# Patient Record
Sex: MALE | Employment: FULL TIME | URBAN - NONMETROPOLITAN AREA
[De-identification: names, ages, dates, MRNs, and addresses within clinical notes are randomized per-mention and may not be internally consistent; named-entity substitution may affect disease eponyms.]

---

## 2024-06-10 ENCOUNTER — NURSE ONLY (OUTPATIENT)
Dept: LAB | Age: 54
End: 2024-06-10

## 2024-06-10 ENCOUNTER — OFFICE VISIT (OUTPATIENT)
Dept: RHEUMATOLOGY | Age: 54
End: 2024-06-10
Payer: COMMERCIAL

## 2024-06-10 VITALS
SYSTOLIC BLOOD PRESSURE: 154 MMHG | DIASTOLIC BLOOD PRESSURE: 90 MMHG | HEART RATE: 89 BPM | OXYGEN SATURATION: 98 % | HEIGHT: 74 IN | WEIGHT: 315 LBS | BODY MASS INDEX: 40.43 KG/M2

## 2024-06-10 DIAGNOSIS — Z79.899 ENCOUNTER FOR LONG-TERM (CURRENT) USE OF HIGH-RISK MEDICATION: ICD-10-CM

## 2024-06-10 DIAGNOSIS — L40.50 PSORIATIC ARTHRITIS (HCC): ICD-10-CM

## 2024-06-10 DIAGNOSIS — L40.50 PSORIATIC ARTHRITIS (HCC): Primary | ICD-10-CM

## 2024-06-10 LAB
ALBUMIN SERPL BCG-MCNC: 4.1 G/DL (ref 3.5–5.1)
ALP SERPL-CCNC: 120 U/L (ref 38–126)
ALT SERPL W/O P-5'-P-CCNC: 19 U/L (ref 11–66)
ANION GAP SERPL CALC-SCNC: 11 MEQ/L (ref 8–16)
AST SERPL-CCNC: 19 U/L (ref 5–40)
BILIRUB SERPL-MCNC: 0.5 MG/DL (ref 0.3–1.2)
BUN SERPL-MCNC: 17 MG/DL (ref 7–22)
CALCIUM SERPL-MCNC: 9.5 MG/DL (ref 8.5–10.5)
CHLORIDE SERPL-SCNC: 103 MEQ/L (ref 98–111)
CO2 SERPL-SCNC: 26 MEQ/L (ref 23–33)
CREAT SERPL-MCNC: 1.1 MG/DL (ref 0.4–1.2)
GFR SERPL CREATININE-BSD FRML MDRD: 80 ML/MIN/1.73M2
GLUCOSE SERPL-MCNC: 109 MG/DL (ref 70–108)
POTASSIUM SERPL-SCNC: 3.6 MEQ/L (ref 3.5–5.2)
PROT SERPL-MCNC: 7.8 G/DL (ref 6.1–8)
SODIUM SERPL-SCNC: 140 MEQ/L (ref 135–145)

## 2024-06-10 PROCEDURE — 99204 OFFICE O/P NEW MOD 45 MIN: CPT | Performed by: INTERNAL MEDICINE

## 2024-06-10 RX ORDER — PREDNISONE 10 MG/1
TABLET ORAL
Qty: 30 TABLET | Refills: 0 | Status: SHIPPED | OUTPATIENT
Start: 2024-06-10 | End: 2024-06-25

## 2024-06-10 RX ORDER — LOSARTAN POTASSIUM AND HYDROCHLOROTHIAZIDE 12.5; 5 MG/1; MG/1
TABLET ORAL
COMMUNITY
Start: 2024-04-30

## 2024-06-10 ASSESSMENT — ENCOUNTER SYMPTOMS
SHORTNESS OF BREATH: 0
VOMITING: 0
NAUSEA: 0
ABDOMINAL PAIN: 0
BLOOD IN STOOL: 0
COUGH: 0

## 2024-06-10 ASSESSMENT — JOINT PAIN
TOTAL NUMBER OF TENDER JOINTS: 5
TOTAL NUMBER OF SWOLLEN JOINTS: 5

## 2024-06-10 NOTE — PROGRESS NOTES
[facility]   Rheumatology Clinic Note      6/10/2024       Reason for Consult:  hand deformities  Requesting Physician:  Yuri Sepulveda MD     CHIEF COMPLAINT:    Chief Complaint   Patient presents with    New Patient     Boutonniere deformity of Rt finger(s)    Other     Deformity is worsened in Lt fingers  Pain in bilateral knees, wrist, Rt middle finger and Lt shoulder.  Symptoms present end of last year  Achy in the mornings  Pain level 5 worsened with applied pressure           HISTORY OF PRESENT ILLNESS:    53 y.o. male presents today for evaluation of hand deformities.    Pain in hands, left wrist started end of last year.  Bilateral knee pain and shoulder started this year.   Stiffness in hands all day.  Left 4th digit deformity.  Ibuprofen provides partial relief.     Has plaque psoriasis in unbilical region and ears.  No IBD-like symptoms.  No h/o uveitis.      Past Medical History:     has a past medical history of Gout and Hypertension.    Past Surgical History:     has no past surgical history on file.    Current Medications:      Current Outpatient Medications:     losartan-hydroCHLOROthiazide (HYZAAR) 50-12.5 MG per tablet, , Disp: , Rfl:     predniSONE (DELTASONE) 10 MG tablet, Take 3 tablets by mouth daily for 5 days, THEN 2 tablets daily for 5 days, THEN 1 tablet daily for 5 days., Disp: 30 tablet, Rfl: 0    Allergies:    Patient has no known allergies.    Social History:     reports that he has never smoked. He has never used smokeless tobacco. He reports current alcohol use. He reports that he does not use drugs.    Family History:   family history includes Gout in his father; Heart Disease in his father; No Known Problems in his mother.      REVIEW OF SYSTEMS:    Review of Systems   Constitutional:  Positive for fatigue. Negative for chills and fever.   Respiratory:  Negative for cough and shortness of breath.    Cardiovascular:  Negative for chest pain and leg swelling.

## 2024-06-12 ENCOUNTER — TELEPHONE (OUTPATIENT)
Dept: RHEUMATOLOGY | Age: 54
End: 2024-06-12

## 2024-06-12 RX ORDER — FOLIC ACID 1 MG/1
1 TABLET ORAL DAILY
Qty: 30 TABLET | Refills: 1 | Status: SHIPPED | OUTPATIENT
Start: 2024-06-12 | End: 2024-08-11

## 2024-06-12 RX ORDER — METHOTREXATE 2.5 MG/1
15 TABLET ORAL WEEKLY
Qty: 24 TABLET | Refills: 1 | Status: SHIPPED | OUTPATIENT
Start: 2024-06-12 | End: 2024-08-11

## 2024-06-12 NOTE — TELEPHONE ENCOUNTER
Please inform pt that his blood work looks good.  I've sent MTX and folic acid to his pharmacy as discussed.

## 2024-06-13 LAB — CYCLIC CITRULLINATED PEPTIDE ANTIBODY IGG: 1.2 U/ML (ref 0–7)

## 2024-08-13 LAB
ALBUMIN: 4.1 G/DL
ALP BLD-CCNC: 104 U/L
ALT SERPL-CCNC: 34 U/L
ANION GAP SERPL CALCULATED.3IONS-SCNC: 11 MMOL/L
AST SERPL-CCNC: 27 U/L
BASOPHILS ABSOLUTE: 0 /ΜL
BASOPHILS RELATIVE PERCENT: 0.4 %
BILIRUB SERPL-MCNC: 0.6 MG/DL (ref 0.1–1.4)
BUN BLDV-MCNC: 16 MG/DL
C-REACTIVE PROTEIN: 4.1
CALCIUM SERPL-MCNC: 8.9 MG/DL
CHLORIDE BLD-SCNC: 103 MMOL/L
CO2: 26 MMOL/L
CREAT SERPL-MCNC: 1.3 MG/DL
EOSINOPHILS ABSOLUTE: 0.2 /ΜL
EOSINOPHILS RELATIVE PERCENT: 3.5 %
GFR, ESTIMATED: 62
GLUCOSE BLD-MCNC: 124 MG/DL
HCT VFR BLD CALC: 43.5 % (ref 41–53)
HEMOGLOBIN: 14.5 G/DL (ref 13.5–17.5)
LYMPHOCYTES ABSOLUTE: 1.6 /ΜL
LYMPHOCYTES RELATIVE PERCENT: 31.7 %
MCH RBC QN AUTO: 31.7 PG
MCHC RBC AUTO-ENTMCNC: 33.3 G/DL
MCV RBC AUTO: 95 FL
MONOCYTES ABSOLUTE: 0.4 /ΜL
MONOCYTES RELATIVE PERCENT: 7.2 %
NEUTROPHILS ABSOLUTE: 3 /ΜL
NEUTROPHILS RELATIVE PERCENT: 56.8 %
PDW BLD-RTO: 47.5 %
PLATELET # BLD: 196 K/ΜL
PMV BLD AUTO: 11 FL
POTASSIUM SERPL-SCNC: 4.1 MMOL/L
RBC # BLD: 4.58 10^6/ΜL
SED RATE, AUTOMATED: 14
SODIUM BLD-SCNC: 140 MMOL/L
TOTAL PROTEIN: 6.3 G/DL (ref 6.4–8.2)
WBC # BLD: 5.2 10^3/ML

## 2024-08-14 ENCOUNTER — TELEPHONE (OUTPATIENT)
Dept: RHEUMATOLOGY | Age: 54
End: 2024-08-14

## 2024-08-14 NOTE — TELEPHONE ENCOUNTER
Please inform patient we received his lab results. His blood counts, liver function, kidney function, and inflammatory markers (CRP, sed rate) were all unremarkable.

## 2024-08-16 ENCOUNTER — OFFICE VISIT (OUTPATIENT)
Dept: RHEUMATOLOGY | Age: 54
End: 2024-08-16
Payer: COMMERCIAL

## 2024-08-16 VITALS
WEIGHT: 315 LBS | SYSTOLIC BLOOD PRESSURE: 142 MMHG | DIASTOLIC BLOOD PRESSURE: 76 MMHG | HEART RATE: 86 BPM | HEIGHT: 74 IN | BODY MASS INDEX: 40.43 KG/M2 | OXYGEN SATURATION: 97 %

## 2024-08-16 DIAGNOSIS — Z79.899 ENCOUNTER FOR LONG-TERM (CURRENT) USE OF HIGH-RISK MEDICATION: ICD-10-CM

## 2024-08-16 DIAGNOSIS — L40.50 PSORIATIC ARTHRITIS (HCC): Primary | ICD-10-CM

## 2024-08-16 PROCEDURE — 99214 OFFICE O/P EST MOD 30 MIN: CPT | Performed by: INTERNAL MEDICINE

## 2024-08-16 RX ORDER — ALLOPURINOL 300 MG/1
300 TABLET ORAL DAILY
COMMUNITY

## 2024-08-16 RX ORDER — FOLIC ACID 1 MG/1
1 TABLET ORAL DAILY
Qty: 30 TABLET | Refills: 2 | Status: SHIPPED | OUTPATIENT
Start: 2024-08-16 | End: 2024-11-14

## 2024-08-16 RX ORDER — METHOTREXATE 2.5 MG/1
15 TABLET ORAL WEEKLY
Qty: 24 TABLET | Refills: 2 | Status: SHIPPED | OUTPATIENT
Start: 2024-08-16 | End: 2024-11-14

## 2024-08-16 ASSESSMENT — ENCOUNTER SYMPTOMS
NAUSEA: 0
VOMITING: 0
ABDOMINAL PAIN: 0
SHORTNESS OF BREATH: 0
COUGH: 0

## 2024-08-16 ASSESSMENT — JOINT PAIN
TOTAL NUMBER OF SWOLLEN JOINTS: 3
TOTAL NUMBER OF TENDER JOINTS: 2

## 2024-08-16 NOTE — PROGRESS NOTES
[facility]   Rheumatology Clinic Note      8/16/2024       Reason for Consult:  hand deformities  Requesting Physician:  No ref. provider found     CHIEF COMPLAINT:    Chief Complaint   Patient presents with    Follow-up     6 week f/u Psoriatic arthritis (HCC)      Other     Pt has slight aches, but is doing well overall.            HISTORY OF PRESENT ILLNESS:    53 y.o. male with psoriatic arthritis and plaque psoriasis presents for follow up. When seen last, he was started on methotrexate 15 mg PO once weekly and folic acid 1 mg daily.    He noted improvement in his joint pain and stiffness since starting methotrexate. Has mild tolerable aches on and off.       RECAP  Pain in hands, left wrist started end of last year.  Bilateral knee pain and shoulder started this year.   Stiffness in hands all day.  Left 4th digit deformity.  Ibuprofen provides partial relief.     Has plaque psoriasis in unbilical region and ears.  No IBD-like symptoms.  No h/o uveitis.      Past Medical History:     has a past medical history of Gout and Hypertension.    Past Surgical History:     has no past surgical history on file.    Current Medications:      Current Outpatient Medications:     allopurinol (ZYLOPRIM) 300 MG tablet, Take 1 tablet by mouth daily, Disp: , Rfl:     folic acid (FOLVITE) 1 MG tablet, Take 1 tablet by mouth daily, Disp: 30 tablet, Rfl: 2    methotrexate (RHEUMATREX) 2.5 MG chemo tablet, Take 6 tablets by mouth once a week, Disp: 24 tablet, Rfl: 2    losartan-hydroCHLOROthiazide (HYZAAR) 50-12.5 MG per tablet, , Disp: , Rfl:     Allergies:    Patient has no known allergies.    Social History:     reports that he has never smoked. He has never used smokeless tobacco. He reports current alcohol use. He reports that he does not use drugs.    Family History:   family history includes Gout in his father; Heart Disease in his father; No Known Problems in his mother.      REVIEW OF SYSTEMS:    Review of

## 2024-10-14 LAB
ALBUMIN: 4.2 G/DL
ALP BLD-CCNC: 101 U/L
ALT SERPL-CCNC: 36 U/L
ANION GAP SERPL CALCULATED.3IONS-SCNC: 12 MMOL/L
AST SERPL-CCNC: 29 U/L
BASOPHILS ABSOLUTE: 0 /ΜL
BASOPHILS RELATIVE PERCENT: 0.2 %
BILIRUB SERPL-MCNC: 0.6 MG/DL (ref 0.1–1.4)
BUN BLDV-MCNC: 15 MG/DL
C-REACTIVE PROTEIN: 4.3
CALCIUM SERPL-MCNC: 8.9 MG/DL
CHLORIDE BLD-SCNC: 103 MMOL/L
CO2: 25 MMOL/L
CREAT SERPL-MCNC: 1.2 MG/DL
EOSINOPHILS ABSOLUTE: 0.1 /ΜL
EOSINOPHILS RELATIVE PERCENT: 2.8 %
GFR, ESTIMATED: 68
GLUCOSE BLD-MCNC: 110 MG/DL
HCT VFR BLD CALC: 44 % (ref 41–53)
HEMOGLOBIN: 14.5 G/DL (ref 13.5–17.5)
LYMPHOCYTES ABSOLUTE: 1.4 /ΜL
LYMPHOCYTES RELATIVE PERCENT: 28.6 %
MCH RBC QN AUTO: 32.6 PG
MCHC RBC AUTO-ENTMCNC: 33 G/DL
MCV RBC AUTO: 98.9 FL
MONOCYTES ABSOLUTE: 0.4 /ΜL
MONOCYTES RELATIVE PERCENT: 8.3 %
NEUTROPHILS ABSOLUTE: 3 /ΜL
NEUTROPHILS RELATIVE PERCENT: 59.9 %
PDW BLD-RTO: 48.7 %
PLATELET # BLD: 170 K/ΜL
PMV BLD AUTO: 11.7 FL
POTASSIUM SERPL-SCNC: 4 MMOL/L
RBC # BLD: 4.45 10^6/ΜL
SED RATE, AUTOMATED: 16
SODIUM BLD-SCNC: 140 MMOL/L
TOTAL PROTEIN: 6.2 G/DL (ref 6.4–8.2)
WBC # BLD: 5 10^3/ML

## 2024-10-17 ENCOUNTER — OFFICE VISIT (OUTPATIENT)
Dept: RHEUMATOLOGY | Age: 54
End: 2024-10-17
Payer: COMMERCIAL

## 2024-10-17 VITALS
DIASTOLIC BLOOD PRESSURE: 72 MMHG | HEIGHT: 74 IN | HEART RATE: 88 BPM | OXYGEN SATURATION: 98 % | WEIGHT: 315 LBS | SYSTOLIC BLOOD PRESSURE: 124 MMHG | BODY MASS INDEX: 40.43 KG/M2

## 2024-10-17 DIAGNOSIS — Z79.899 ENCOUNTER FOR LONG-TERM (CURRENT) USE OF HIGH-RISK MEDICATION: ICD-10-CM

## 2024-10-17 DIAGNOSIS — L40.50 PSORIATIC ARTHRITIS (HCC): Primary | ICD-10-CM

## 2024-10-17 PROCEDURE — 99214 OFFICE O/P EST MOD 30 MIN: CPT | Performed by: NURSE PRACTITIONER

## 2024-10-17 RX ORDER — METHOTREXATE 2.5 MG/1
15 TABLET ORAL WEEKLY
Qty: 72 TABLET | Refills: 0 | Status: SHIPPED | OUTPATIENT
Start: 2024-10-17 | End: 2025-01-15

## 2024-10-17 RX ORDER — FOLIC ACID 1 MG/1
1 TABLET ORAL DAILY
Qty: 90 TABLET | Refills: 1 | Status: SHIPPED | OUTPATIENT
Start: 2024-10-17 | End: 2025-04-15

## 2024-10-17 RX ORDER — PREDNISONE 10 MG/1
TABLET ORAL
Qty: 20 TABLET | Refills: 0 | Status: SHIPPED | OUTPATIENT
Start: 2024-10-17

## 2024-10-17 ASSESSMENT — ENCOUNTER SYMPTOMS
BACK PAIN: 0
DIARRHEA: 0
SHORTNESS OF BREATH: 0
COLOR CHANGE: 0
EYE PAIN: 0
ABDOMINAL PAIN: 0
TROUBLE SWALLOWING: 0
BLOOD IN STOOL: 0

## 2024-10-17 NOTE — PROGRESS NOTES
[facility]   Rheumatology Clinic Note      10/17/2024       CHIEF COMPLAINT:    Chief Complaint   Patient presents with    Follow-up     2 month f/u Psoriatic arthritis (HCC)    Other     Pt is tolerating well           HISTORY OF PRESENT ILLNESS:    53 y.o. male psoriatic arthritis and plaque psoriasis presents for follow up. Presently on methotrexate PO 15 mg weekly and folic acid 1 mg daily.    Overall the patient is doing well. Patient reports he took his methotrexate a few days late one week due to traveling and noticed more aching to his knees and hands. He also noticed having more trouble making a fist. He states he still has slight issue making a fist with his left hand, but it is improving. Denies painful swelling. He has morning stiffness that lasts approximately \"10 steps.\" Initially patient noted some diarrhea and brain fog with the methotrexate, but states this has been improving. He feels overall his energy level is reasonably good. Denies CP, SOB.    Recap:  Pain in hands, knees, shoulder, left wrist  Stiffness in hands  Left 3rd digit deformity  Plaque psoriasis in umbilical region and ears  No IBD symptoms, no hx of uveitis     Past Medical History:     has a past medical history of Gout and Hypertension.    Past Surgical History:     has no past surgical history on file.    Current Medications:      Current Outpatient Medications:     folic acid (FOLVITE) 1 MG tablet, Take 1 tablet by mouth daily, Disp: 90 tablet, Rfl: 1    methotrexate (RHEUMATREX) 2.5 MG chemo tablet, Take 6 tablets by mouth once a week, Disp: 72 tablet, Rfl: 0    predniSONE (DELTASONE) 10 MG tablet, Take 1 tablet daily for 3-4 days as needed for a flare-up, Disp: 20 tablet, Rfl: 0    allopurinol (ZYLOPRIM) 300 MG tablet, Take 1 tablet by mouth daily, Disp: , Rfl:     losartan-hydroCHLOROthiazide (HYZAAR) 50-12.5 MG per tablet, , Disp: , Rfl:     Allergies:    Patient has no known allergies.    Social History:

## 2025-01-06 ENCOUNTER — TELEPHONE (OUTPATIENT)
Age: 55
End: 2025-01-06

## 2025-01-06 DIAGNOSIS — L40.50 PSORIATIC ARTHRITIS (HCC): ICD-10-CM

## 2025-01-06 RX ORDER — METHOTREXATE 2.5 MG/1
15 TABLET ORAL WEEKLY
Qty: 72 TABLET | Refills: 0 | Status: SHIPPED | OUTPATIENT
Start: 2025-01-06 | End: 2025-04-06

## 2025-01-06 RX ORDER — FOLIC ACID 1 MG/1
1 TABLET ORAL DAILY
Qty: 90 TABLET | Refills: 1 | Status: SHIPPED | OUTPATIENT
Start: 2025-01-06 | End: 2025-07-05

## 2025-01-06 NOTE — TELEPHONE ENCOUNTER
Patient is calling in needing to change pharmacies to [facility],  He will need his methotrexate and folic acid refilled to [facility].  He said he will get his blood work done asap.  Please advise.

## 2025-01-06 NOTE — TELEPHONE ENCOUNTER
Labs needed prior to methotrexate refill. Left voice message to notify patient.     DOLV: 10/17/24  DONV: 1/30/25

## 2025-01-07 LAB
ALBUMIN: 4.4 G/DL
ALP BLD-CCNC: 114 U/L
ALT SERPL-CCNC: 53 U/L
ANION GAP SERPL CALCULATED.3IONS-SCNC: 12 MMOL/L
AST SERPL-CCNC: 43 U/L
BASOPHILS ABSOLUTE: 0 /ΜL
BASOPHILS RELATIVE PERCENT: 0.2 %
BILIRUB SERPL-MCNC: 0.8 MG/DL (ref 0.1–1.4)
BUN BLDV-MCNC: 14 MG/DL
C-REACTIVE PROTEIN: 3.4
CALCIUM SERPL-MCNC: 8.9 MG/DL
CHLORIDE BLD-SCNC: 102 MMOL/L
CO2: 25 MMOL/L
CREAT SERPL-MCNC: 1.2 MG/DL
EOSINOPHILS ABSOLUTE: 0.1 /ΜL
EOSINOPHILS RELATIVE PERCENT: 2.4 %
GFR, ESTIMATED: 68
GLUCOSE BLD-MCNC: 119 MG/DL
HCT VFR BLD CALC: 45.6 % (ref 41–53)
HEMOGLOBIN: 15.2 G/DL (ref 13.5–17.5)
LYMPHOCYTES ABSOLUTE: 1.3 /ΜL
LYMPHOCYTES RELATIVE PERCENT: 28.4 %
MCH RBC QN AUTO: 32.7 PG
MCHC RBC AUTO-ENTMCNC: 33.3 G/DL
MCV RBC AUTO: 98.1 FL
MONOCYTES ABSOLUTE: 0.4 /ΜL
MONOCYTES RELATIVE PERCENT: 8.6 %
NEUTROPHILS ABSOLUTE: 2.7 /ΜL
NEUTROPHILS RELATIVE PERCENT: 60.2 %
PDW BLD-RTO: 47.1 %
PLATELET # BLD: 185 K/ΜL
PMV BLD AUTO: 11.2 FL
POTASSIUM SERPL-SCNC: 3.9 MMOL/L
RBC # BLD: 4.65 10^6/ΜL
SED RATE, AUTOMATED: 12
SODIUM BLD-SCNC: 139 MMOL/L
TOTAL PROTEIN: 6.6 G/DL (ref 6.4–8.2)
WBC # BLD: 4.5 10^3/ML

## 2025-01-09 DIAGNOSIS — L40.50 PSORIATIC ARTHRITIS (HCC): ICD-10-CM

## 2025-01-09 RX ORDER — METHOTREXATE 2.5 MG/1
15 TABLET ORAL WEEKLY
Qty: 72 TABLET | Refills: 0 | OUTPATIENT
Start: 2025-01-09 | End: 2025-04-09

## 2025-01-09 NOTE — TELEPHONE ENCOUNTER
Tong Schultz called requesting a refill on the following medications:  Requested Prescriptions     Pending Prescriptions Disp Refills    methotrexate (RHEUMATREX) 2.5 MG chemo tablet 72 tablet 0     Sig: Take 6 tablets by mouth once a week     Pharmacy verified:    [facility], [facility] - [address] [phone] - F [phone]     Date of last visit: 10/17/2024  Date of next visit (if applicable): 1/30/2025    Pt had lab work done

## 2025-01-30 ENCOUNTER — OFFICE VISIT (OUTPATIENT)
Age: 55
End: 2025-01-30
Payer: COMMERCIAL

## 2025-01-30 VITALS
SYSTOLIC BLOOD PRESSURE: 132 MMHG | BODY MASS INDEX: 40.43 KG/M2 | HEART RATE: 64 BPM | HEIGHT: 74 IN | OXYGEN SATURATION: 100 % | WEIGHT: 315 LBS | DIASTOLIC BLOOD PRESSURE: 84 MMHG

## 2025-01-30 DIAGNOSIS — Z79.899 ENCOUNTER FOR LONG-TERM (CURRENT) USE OF HIGH-RISK MEDICATION: ICD-10-CM

## 2025-01-30 DIAGNOSIS — L40.50 PSORIATIC ARTHRITIS (HCC): Primary | ICD-10-CM

## 2025-01-30 PROCEDURE — 99214 OFFICE O/P EST MOD 30 MIN: CPT | Performed by: INTERNAL MEDICINE

## 2025-01-30 ASSESSMENT — ENCOUNTER SYMPTOMS
NAUSEA: 0
SHORTNESS OF BREATH: 0
COUGH: 0
VOMITING: 0
ABDOMINAL PAIN: 0

## 2025-01-30 NOTE — PROGRESS NOTES
[facility]   Rheumatology Clinic Note      1/30/2025         CHIEF COMPLAINT:    Chief Complaint   Patient presents with    Follow-up     3 month follow up Psoriatic arthritis (HCC)    Other     He reports that he is tolerating well.  Pain level 0            HISTORY OF PRESENT ILLNESS:    54 y.o. male with psoriatic arthritis and plaque psoriasis presents for follow up. Presently, he is on methotrexate 15 mg PO once weekly and folic acid 1 mg daily.    Overall is doing well. Has mild stiffness in hands and knees upon awakening that lasts for few minutes. No significant joint pain or joint swelling.      Initial Consultation  6/10/2024  Pain in hands, left wrist started end of last year.  Bilateral knee pain and shoulder started this year.   Stiffness in hands all day.  Left 4th digit deformity.  Ibuprofen provides partial relief.     Has plaque psoriasis in unbilical region and ears.  No IBD-like symptoms.  No h/o uveitis.      Past Medical History:     has a past medical history of Gout and Hypertension.    Past Surgical History:     has no past surgical history on file.    Current Medications:      Current Outpatient Medications:     folic acid (FOLVITE) 1 MG tablet, Take 1 tablet by mouth daily, Disp: 90 tablet, Rfl: 1    methotrexate (RHEUMATREX) 2.5 MG chemo tablet, Take 6 tablets by mouth once a week, Disp: 72 tablet, Rfl: 0    predniSONE (DELTASONE) 10 MG tablet, Take 1 tablet daily for 3-4 days as needed for a flare-up, Disp: 20 tablet, Rfl: 0    allopurinol (ZYLOPRIM) 300 MG tablet, Take 1 tablet by mouth daily, Disp: , Rfl:     losartan-hydroCHLOROthiazide (HYZAAR) 50-12.5 MG per tablet, , Disp: , Rfl:     Allergies:    Patient has no known allergies.    Social History:     reports that he has never smoked. He has never used smokeless tobacco. He reports current alcohol use. He reports that he does not use drugs.    Family History:   family history includes Gout in his father; Heart Disease in

## 2025-03-25 LAB
ALBUMIN: 4.1 G/DL
ALP BLD-CCNC: 103 U/L
ALT SERPL-CCNC: 37 U/L
ANION GAP SERPL CALCULATED.3IONS-SCNC: 13 MMOL/L
AST SERPL-CCNC: 30 U/L
BASOPHILS ABSOLUTE: 0 /ΜL
BASOPHILS RELATIVE PERCENT: 0.4 %
BILIRUB SERPL-MCNC: 0.6 MG/DL (ref 0.1–1.4)
BUN BLDV-MCNC: 13 MG/DL
C-REACTIVE PROTEIN: 4.3
CALCIUM SERPL-MCNC: 8.6 MG/DL
CHLORIDE BLD-SCNC: 105 MMOL/L
CO2: 23 MMOL/L
CREAT SERPL-MCNC: 1.2 MG/DL
EOSINOPHILS ABSOLUTE: 0.1 /ΜL
EOSINOPHILS RELATIVE PERCENT: 3 %
GFR, ESTIMATED: 68
GLUCOSE BLD-MCNC: 114 MG/DL
HCT VFR BLD CALC: 43.6 % (ref 41–53)
HEMOGLOBIN: 14.5 G/DL (ref 13.5–17.5)
LYMPHOCYTES ABSOLUTE: 1.3 /ΜL
LYMPHOCYTES RELATIVE PERCENT: 27 %
MCH RBC QN AUTO: 32.7 PG
MCHC RBC AUTO-ENTMCNC: 33.3 G/DL
MCV RBC AUTO: 98.4 FL
MONOCYTES ABSOLUTE: 0.4 /ΜL
MONOCYTES RELATIVE PERCENT: 7.9 %
NEUTROPHILS ABSOLUTE: 2.9 /ΜL
NEUTROPHILS RELATIVE PERCENT: 61.5 %
PDW BLD-RTO: 46.1 %
PLATELET # BLD: 187 K/ΜL
PMV BLD AUTO: 11.2 FL
POTASSIUM SERPL-SCNC: 4.2 MMOL/L
RBC # BLD: 4.43 10^6/ΜL
SED RATE, AUTOMATED: 17
SODIUM BLD-SCNC: 141 MMOL/L
TOTAL PROTEIN: 6.4 G/DL (ref 6.4–8.2)
WBC # BLD: 4.7 10^3/ML

## 2025-03-27 DIAGNOSIS — L40.50 PSORIATIC ARTHRITIS (HCC): ICD-10-CM

## 2025-03-27 RX ORDER — METHOTREXATE 2.5 MG/1
15 TABLET ORAL WEEKLY
Qty: 72 TABLET | Refills: 0 | Status: SHIPPED | OUTPATIENT
Start: 2025-03-27 | End: 2025-06-25

## 2025-03-27 NOTE — TELEPHONE ENCOUNTER
Tong is requesting a refill of their   Requested Prescriptions     Pending Prescriptions Disp Refills    methotrexate (RHEUMATREX) 2.5 MG chemo tablet 72 tablet 0     Sig: Take 6 tablets by mouth once a week   . Please advise.      Last Appt:  Visit date not found  Next Appt:   Visit date not found  Preferred pharmacy:   [facility], [facility]. [address] [phone] - F [phone]     Patient had blood work on Monday.

## 2025-03-27 NOTE — TELEPHONE ENCOUNTER
DOLV: 01/30/25  DONV: 07/31/25  LAST LAB DRAW: 03/25/25  LAST TB TEST: na    Lab Results   Component Value Date     03/25/2025    K 4.2 03/25/2025     03/25/2025    CO2 23 03/25/2025    BUN 13 03/25/2025    CREATININE 1.2 03/25/2025    GLUCOSE 114 03/25/2025    CALCIUM 8.6 03/25/2025    BILITOT 0.6 03/25/2025    ALKPHOS 103 03/25/2025    AST 30 03/25/2025    ALT 37 03/25/2025    LABGLOM 68 03/25/2025       Recent Labs     03/25/25  1110   WBC 4.7   HGB 14.5   HCT 43.6   MCV 98.4          Lab Results   Component Value Date    SEDRATE 17 03/25/2025       Lab Results   Component Value Date    CRP 4.3 03/25/2025

## 2025-05-21 DIAGNOSIS — L40.50 PSORIATIC ARTHRITIS (HCC): ICD-10-CM

## 2025-05-21 NOTE — TELEPHONE ENCOUNTER
Left voicemail for patient to return call regarding folic acid request.  Patient should have enough medication until July.

## 2025-05-22 RX ORDER — FOLIC ACID 1 MG/1
1000 TABLET ORAL DAILY
Qty: 90 TABLET | Refills: 1 | OUTPATIENT
Start: 2025-05-22

## 2025-07-01 LAB
ALBUMIN: 4 G/DL
ALP BLD-CCNC: 103 U/L
ALT SERPL-CCNC: 43 U/L
ANION GAP SERPL CALCULATED.3IONS-SCNC: 15 MMOL/L
AST SERPL-CCNC: 31 U/L
BASOPHILS ABSOLUTE: 0 /ΜL
BASOPHILS RELATIVE PERCENT: 0.4 %
BILIRUB SERPL-MCNC: 0.6 MG/DL (ref 0.1–1.4)
BUN BLDV-MCNC: 15 MG/DL
C-REACTIVE PROTEIN: 3.8
CALCIUM SERPL-MCNC: 8.9 MG/DL
CHLORIDE BLD-SCNC: 102 MMOL/L
CO2: 22 MMOL/L
CREAT SERPL-MCNC: 1 MG/DL
EOSINOPHILS ABSOLUTE: 0.2 /ΜL
EOSINOPHILS RELATIVE PERCENT: 4 %
GFR, ESTIMATED: 85
GLUCOSE BLD-MCNC: 113 MG/DL
HCT VFR BLD CALC: 43.7 % (ref 41–53)
HEMOGLOBIN: 14.7 G/DL (ref 13.5–17.5)
LYMPHOCYTES ABSOLUTE: 1.4 /ΜL
LYMPHOCYTES RELATIVE PERCENT: 27.6 %
MCH RBC QN AUTO: 32.7 PG
MCHC RBC AUTO-ENTMCNC: 33.6 G/DL
MCV RBC AUTO: 97.3 FL
MONOCYTES ABSOLUTE: 0.4 /ΜL
MONOCYTES RELATIVE PERCENT: 7.5 %
NEUTROPHILS ABSOLUTE: 3.1 /ΜL
NEUTROPHILS RELATIVE PERCENT: 60.3 %
PDW BLD-RTO: 44.9 %
PLATELET # BLD: 199 K/ΜL
PMV BLD AUTO: 10.6 FL
POTASSIUM SERPL-SCNC: 4.2 MMOL/L
RBC # BLD: 4.49 10^6/ΜL
SED RATE, AUTOMATED: 21
SODIUM BLD-SCNC: 139 MMOL/L
TOTAL PROTEIN: 6.1 G/DL (ref 6.4–8.2)
WBC # BLD: 5.2 10^3/ML

## 2025-07-03 DIAGNOSIS — L40.50 PSORIATIC ARTHRITIS (HCC): ICD-10-CM

## 2025-07-03 RX ORDER — METHOTREXATE 2.5 MG/1
15 TABLET ORAL WEEKLY
Qty: 72 TABLET | Refills: 0 | Status: SHIPPED | OUTPATIENT
Start: 2025-07-03 | End: 2025-10-01

## 2025-07-03 NOTE — TELEPHONE ENCOUNTER
DOLV: 1/30/25  DONV: 7/31/25  LAST LAB DRAW: 7/1/25  LAST TB TEST: n/a    Lab Results   Component Value Date     07/01/2025    K 4.2 07/01/2025     07/01/2025    CO2 22 07/01/2025    BUN 15 07/01/2025    CREATININE 1.0 07/01/2025    GLUCOSE 113 07/01/2025    CALCIUM 8.9 07/01/2025    BILITOT 0.6 07/01/2025    ALKPHOS 103 07/01/2025    AST 31 07/01/2025    ALT 43 07/01/2025    LABGLOM 85 07/01/2025       Recent Labs     07/01/25  1149   WBC 5.2   HGB 14.7   HCT 43.7   MCV 97.3          Lab Results   Component Value Date    SEDRATE 21 07/01/2025       Lab Results   Component Value Date    CRP 3.8 07/01/2025

## 2025-07-03 NOTE — TELEPHONE ENCOUNTER
Tong is requesting a refill of their   Requested Prescriptions     Pending Prescriptions Disp Refills    methotrexate (RHEUMATREX) 2.5 MG chemo tablet 72 tablet 0     Sig: Take 6 tablets by mouth once a week   . Please advise.      Last Appt:  Visit date not found  Next Appt:  7/31/25  Preferred pharmacy:     [facility], [facility]. [address] [phone] - F [phone]       Patient states he just had lood work done.

## 2025-07-08 ENCOUNTER — TELEPHONE (OUTPATIENT)
Age: 55
End: 2025-07-08

## 2025-07-09 NOTE — TELEPHONE ENCOUNTER
Attempted methotrexate prior auth via [facility] for 90 day supply and got message back resubmit using valid day supply. Resubmitted for 30 day supply and was informed available without authorization. Patient notified and verbalized understanding.

## 2025-07-31 ENCOUNTER — OFFICE VISIT (OUTPATIENT)
Age: 55
End: 2025-07-31
Payer: COMMERCIAL

## 2025-07-31 VITALS
OXYGEN SATURATION: 98 % | HEART RATE: 75 BPM | SYSTOLIC BLOOD PRESSURE: 130 MMHG | WEIGHT: 315 LBS | DIASTOLIC BLOOD PRESSURE: 78 MMHG | BODY MASS INDEX: 40.43 KG/M2 | HEIGHT: 74 IN

## 2025-07-31 DIAGNOSIS — Z79.899 ENCOUNTER FOR LONG-TERM (CURRENT) USE OF HIGH-RISK MEDICATION: ICD-10-CM

## 2025-07-31 DIAGNOSIS — L40.50 PSORIATIC ARTHRITIS (HCC): Primary | ICD-10-CM

## 2025-07-31 PROCEDURE — 99214 OFFICE O/P EST MOD 30 MIN: CPT | Performed by: INTERNAL MEDICINE

## 2025-07-31 RX ORDER — FOLIC ACID 1 MG/1
1 TABLET ORAL DAILY
Qty: 90 TABLET | Refills: 3 | Status: SHIPPED | OUTPATIENT
Start: 2025-07-31 | End: 2026-01-27

## 2025-07-31 ASSESSMENT — ENCOUNTER SYMPTOMS
VOMITING: 0
ABDOMINAL PAIN: 0
NAUSEA: 0
COUGH: 0
SHORTNESS OF BREATH: 0

## 2025-07-31 NOTE — PROGRESS NOTES
[facility]   Rheumatology Clinic Note      7/31/2025         CHIEF COMPLAINT:    Chief Complaint   Patient presents with    6 Month Follow-Up     Psoriatic arthritis (HCC)    Other     Patient is tolerating well          HISTORY OF PRESENT ILLNESS:    54 y.o. male with psoriatic arthritis and plaque psoriasis presents for follow up. Presently, he is on methotrexate 15 mg PO once weekly and folic acid 1 mg daily.    Overall is doing well. Has mild stiffness and soreness in his wrists upon awakening in the morning that lasts for couple of minutes and improves with stretching and rotating his wrists.  No other joint pain. No prolonged morning stiffness. No flare ups since seen last.    States that he has been swimming in pool every week and exercising, which has helped significantly.    Small areas of psoriasis on scalp that is tolerable.    Initial Consultation  6/10/2024  Pain in hands, left wrist started end of last year.  Bilateral knee pain and shoulder started this year.   Stiffness in hands all day.  Left 4th digit deformity.  Ibuprofen provides partial relief.     Has plaque psoriasis in unbilical region and ears.  No IBD-like symptoms.  No h/o uveitis.      Past Medical History:     has a past medical history of Gout and Hypertension.    Past Surgical History:     has no past surgical history on file.    Current Medications:      Current Outpatient Medications:     folic acid (FOLVITE) 1 MG tablet, Take 1 tablet by mouth daily, Disp: 90 tablet, Rfl: 3    methotrexate (RHEUMATREX) 2.5 MG chemo tablet, Take 6 tablets by mouth once a week, Disp: 72 tablet, Rfl: 0    predniSONE (DELTASONE) 10 MG tablet, Take 1 tablet daily for 3-4 days as needed for a flare-up, Disp: 20 tablet, Rfl: 0    allopurinol (ZYLOPRIM) 300 MG tablet, Take 1 tablet by mouth daily, Disp: , Rfl:     losartan-hydroCHLOROthiazide (HYZAAR) 50-12.5 MG per tablet, , Disp: , Rfl:     Allergies:    Patient has no known